# Patient Record
Sex: MALE | Race: BLACK OR AFRICAN AMERICAN | NOT HISPANIC OR LATINO | URBAN - METROPOLITAN AREA
[De-identification: names, ages, dates, MRNs, and addresses within clinical notes are randomized per-mention and may not be internally consistent; named-entity substitution may affect disease eponyms.]

---

## 2017-06-29 NOTE — PATIENT DISCUSSION
(Q97.265) Keratoconjunct sicca, not specified as Sjogren's, bilateral - Assesment : Examination revealed Dry Eye Syndrome - Plan : Monitor for changes. Recommended Refresh Optive prn or 2-3 times per day. Coupon given.

## 2017-06-29 NOTE — PATIENT DISCUSSION
(H25.13) Age-related nuclear cataract, bilateral - Assesment : Examination revealed cataract. Mild symptoms. - Plan : Monitor for changes. Advised patient to call our office with decreased vision or increased symptoms. Updated GL RX and CL Rx given today. RTC in 1 year for CL Exam, sooner if problems or changes.

## 2017-08-28 ENCOUNTER — IMPORTED ENCOUNTER (OUTPATIENT)
Dept: URBAN - METROPOLITAN AREA CLINIC 9 | Facility: CLINIC | Age: 72
End: 2017-08-28

## 2017-11-16 ENCOUNTER — IMPORTED ENCOUNTER (OUTPATIENT)
Dept: URBAN - METROPOLITAN AREA CLINIC 9 | Facility: CLINIC | Age: 72
End: 2017-11-16

## 2018-02-07 ENCOUNTER — IMPORTED ENCOUNTER (OUTPATIENT)
Dept: URBAN - METROPOLITAN AREA CLINIC 9 | Facility: CLINIC | Age: 73
End: 2018-02-07

## 2018-11-09 ENCOUNTER — IMPORTED ENCOUNTER (OUTPATIENT)
Dept: URBAN - METROPOLITAN AREA CLINIC 9 | Facility: CLINIC | Age: 73
End: 2018-11-09

## 2019-03-21 NOTE — PATIENT DISCUSSION
(H25.13) Age-related nuclear cataract, bilateral - Assesment : Examination revealed cataract. Mild symptoms. - Plan : Monitor for changes. Advised patient to call our office with decreased vision or increased symptoms. Updated CL Trials given today. Follow with Chrissy Rodriguez for further changes. RTC in 1 year for CL Exam, sooner if problems or changes.

## 2019-03-21 NOTE — PATIENT DISCUSSION
(V17.715) Keratoconjunct sicca, not specified as Sjogren's, bilateral - Assesment : Examination revealed Dry Eye Syndrome - Plan : Monitor for changes. Refresh Optive 2-3 times per day or prn.

## 2019-04-24 ENCOUNTER — IMPORTED ENCOUNTER (OUTPATIENT)
Dept: URBAN - METROPOLITAN AREA CLINIC 9 | Facility: CLINIC | Age: 74
End: 2019-04-24

## 2019-05-17 ENCOUNTER — IMPORTED ENCOUNTER (OUTPATIENT)
Dept: URBAN - METROPOLITAN AREA CLINIC 9 | Facility: CLINIC | Age: 74
End: 2019-05-17

## 2020-02-03 ENCOUNTER — IMPORTED ENCOUNTER (OUTPATIENT)
Dept: URBAN - METROPOLITAN AREA CLINIC 9 | Facility: CLINIC | Age: 75
End: 2020-02-03

## 2020-04-01 ENCOUNTER — IMPORTED ENCOUNTER (OUTPATIENT)
Dept: URBAN - METROPOLITAN AREA CLINIC 9 | Facility: CLINIC | Age: 75
End: 2020-04-01

## 2021-02-25 ENCOUNTER — IMPORTED ENCOUNTER (OUTPATIENT)
Dept: URBAN - METROPOLITAN AREA CLINIC 9 | Facility: CLINIC | Age: 76
End: 2021-02-25

## 2021-10-16 ASSESSMENT — VISUAL ACUITY
OD_SC: 20/20 SN
OD_CC: 20/20 SN
OS_CC: 20/20 -2 SN
OD_CC: 20/20 - SN
OS_CC: 20/20 - SN
OD_CC: 20/20 - SN
OD_CC: 20/20 SN
OS_SC: 20/20 SN
OS_CC: 20/20 SN

## 2021-10-16 ASSESSMENT — TONOMETRY
OS_IOP_MMHG: 22
OS_IOP_MMHG: 17
OD_IOP_MMHG: 20
OD_IOP_MMHG: 19
OD_IOP_MMHG: 21
OS_IOP_MMHG: 22
OD_IOP_MMHG: 20
OD_IOP_MMHG: 18
OD_IOP_MMHG: 18
OS_IOP_MMHG: 21
OD_IOP_MMHG: 21
OS_IOP_MMHG: 18
OS_IOP_MMHG: 19
OD_IOP_MMHG: 18
OS_IOP_MMHG: 18
OS_IOP_MMHG: 20

## 2021-10-16 ASSESSMENT — KERATOMETRY
OS_K2POWER_DIOPTERS: 41
OD_AXISANGLE_DEGREES: 79
OS_K1POWER_DIOPTERS: 40.75
OS_AXISANGLE_DEGREES: 38
OD_K2POWER_DIOPTERS: 41
OD_AXISANGLE2_DEGREES: 6
OS_K1POWER_DIOPTERS: 40.5
OD_K2POWER_DIOPTERS: 40.75
OD_AXISANGLE_DEGREES: 96
OD_K1POWER_DIOPTERS: 40.25
OS_AXISANGLE2_DEGREES: 128
OS_AXISANGLE_DEGREES: 52
OS_AXISANGLE2_DEGREES: 142
OD_AXISANGLE2_DEGREES: 169
OD_K1POWER_DIOPTERS: 39.75
OS_K2POWER_DIOPTERS: 41.25

## 2022-02-15 ENCOUNTER — ESTABLISHED PATIENT (OUTPATIENT)
Dept: URBAN - NONMETROPOLITAN AREA CLINIC 6 | Facility: CLINIC | Age: 77
End: 2022-02-15

## 2022-02-15 DIAGNOSIS — H02.122: ICD-10-CM

## 2022-02-15 DIAGNOSIS — H25.12: ICD-10-CM

## 2022-02-15 DIAGNOSIS — H25.11: ICD-10-CM

## 2022-02-15 DIAGNOSIS — H04.122: ICD-10-CM

## 2022-02-15 DIAGNOSIS — H02.051: ICD-10-CM

## 2022-02-15 DIAGNOSIS — H04.121: ICD-10-CM

## 2022-02-15 DIAGNOSIS — H40.013: ICD-10-CM

## 2022-02-15 DIAGNOSIS — H01.02A: ICD-10-CM

## 2022-02-15 DIAGNOSIS — H01.02B: ICD-10-CM

## 2022-02-15 PROCEDURE — 92014 COMPRE OPH EXAM EST PT 1/>: CPT

## 2022-02-15 PROCEDURE — 92133 CPTRZD OPH DX IMG PST SGM ON: CPT

## 2022-02-15 RX ORDER — NEOMYCIN SULFATE, POLYMYXIN B SULFATE AND DEXAMETHASONE 3.5; 10000; 1 MG/ML; [USP'U]/ML; MG/ML: 1 SUSPENSION OPHTHALMIC

## 2022-02-15 ASSESSMENT — VISUAL ACUITY
OS_GLARE: 20/40
OD_GLARE: 20/40
OU_CC: 20/20
OS_CC: 20/20-1
OD_CC: 20/20-1

## 2022-02-15 ASSESSMENT — TONOMETRY
OS_IOP_MMHG: 21
OD_IOP_MMHG: 20

## 2022-02-15 ASSESSMENT — KERATOMETRY
OS_AXISANGLE_DEGREES: 151
OS_AXISANGLE2_DEGREES: 61
OS_K1POWER_DIOPTERS: 40.50
OD_AXISANGLE2_DEGREES: 29
OS_K2POWER_DIOPTERS: 41.00
OD_AXISANGLE_DEGREES: 119
OD_K2POWER_DIOPTERS: 41.25
OD_K1POWER_DIOPTERS: 40.25

## 2022-08-18 ENCOUNTER — FOLLOW UP (OUTPATIENT)
Dept: URBAN - NONMETROPOLITAN AREA CLINIC 6 | Facility: CLINIC | Age: 77
End: 2022-08-18

## 2022-08-18 DIAGNOSIS — H01.02B: ICD-10-CM

## 2022-08-18 DIAGNOSIS — H04.123: ICD-10-CM

## 2022-08-18 DIAGNOSIS — H40.013: ICD-10-CM

## 2022-08-18 DIAGNOSIS — H01.02A: ICD-10-CM

## 2022-08-18 DIAGNOSIS — H52.03: ICD-10-CM

## 2022-08-18 PROCEDURE — 99213 OFFICE O/P EST LOW 20 MIN: CPT

## 2022-08-18 PROCEDURE — 92015 DETERMINE REFRACTIVE STATE: CPT

## 2022-08-18 RX ORDER — CARBOXYMETHYLCELLULOSE SODIUM AND GLYCERIN 5; 9 MG/ML; MG/ML: 1 SOLUTION/ DROPS OPHTHALMIC AS NEEDED

## 2022-08-18 ASSESSMENT — VISUAL ACUITY
OS_CC: 20/20-2
OD_CC: 20/20
OU_CC: 20/20

## 2022-08-18 ASSESSMENT — TONOMETRY
OS_IOP_MMHG: 19
OD_IOP_MMHG: 19

## 2023-05-19 ENCOUNTER — EMERGENCY VISIT (OUTPATIENT)
Dept: URBAN - METROPOLITAN AREA CLINIC 6 | Facility: CLINIC | Age: 78
End: 2023-05-19

## 2023-05-19 DIAGNOSIS — H02.122: ICD-10-CM

## 2023-05-19 DIAGNOSIS — H00.12: ICD-10-CM

## 2023-05-19 DIAGNOSIS — H02.125: ICD-10-CM

## 2023-05-19 DIAGNOSIS — H00.15: ICD-10-CM

## 2023-05-19 DIAGNOSIS — H40.013: ICD-10-CM

## 2023-05-19 PROCEDURE — 92012 INTRM OPH EXAM EST PATIENT: CPT

## 2023-05-19 PROCEDURE — 67800 REMOVE EYELID LESION: CPT

## 2023-05-19 RX ORDER — NEOMYCIN SULFATE, POLYMYXIN B SULFATE AND DEXAMETHASONE 3.5; 10000; 1 MG/G; [USP'U]/G; MG/G: OINTMENT OPHTHALMIC

## 2023-05-19 ASSESSMENT — KERATOMETRY
OD_AXISANGLE_DEGREES: 87
OS_AXISANGLE_DEGREES: 59
OS_K2POWER_DIOPTERS: 41.00
OD_AXISANGLE2_DEGREES: 177
OD_K2POWER_DIOPTERS: 41.00
OS_AXISANGLE2_DEGREES: 149
OS_K1POWER_DIOPTERS: 40.50
OD_K1POWER_DIOPTERS: 40.00

## 2023-05-19 ASSESSMENT — VISUAL ACUITY
OD_CC: 20/25-1
OS_CC: 20/25-2

## 2023-05-19 ASSESSMENT — TONOMETRY
OD_IOP_MMHG: 12
OS_IOP_MMHG: 14

## 2023-06-08 ENCOUNTER — CONSULTATION/EVALUATION (OUTPATIENT)
Dept: URBAN - NONMETROPOLITAN AREA CLINIC 6 | Facility: CLINIC | Age: 78
End: 2023-06-08

## 2023-06-08 DIAGNOSIS — H02.125: ICD-10-CM

## 2023-06-08 DIAGNOSIS — H02.122: ICD-10-CM

## 2023-06-08 PROCEDURE — 92012 INTRM OPH EXAM EST PATIENT: CPT

## 2023-06-08 PROCEDURE — 92285 EXTERNAL OCULAR PHOTOGRAPHY: CPT

## 2023-06-08 ASSESSMENT — KERATOMETRY
OD_K2POWER_DIOPTERS: 41.00
OD_K1POWER_DIOPTERS: 40.00
OS_AXISANGLE2_DEGREES: 149
OS_K2POWER_DIOPTERS: 41.00
OD_AXISANGLE2_DEGREES: 177
OS_K1POWER_DIOPTERS: 40.50
OS_AXISANGLE_DEGREES: 59
OD_AXISANGLE_DEGREES: 87

## 2023-06-08 ASSESSMENT — VISUAL ACUITY
OD_CC: 20/25
OS_CC: 20/25

## 2023-06-19 ENCOUNTER — POST-OP (OUTPATIENT)
Dept: URBAN - NONMETROPOLITAN AREA CLINIC 6 | Facility: CLINIC | Age: 78
End: 2023-06-19

## 2023-06-19 DIAGNOSIS — Z98.890: ICD-10-CM

## 2023-06-19 PROCEDURE — 99024 POSTOP FOLLOW-UP VISIT: CPT

## 2023-06-19 ASSESSMENT — VISUAL ACUITY
OD_CC: 20/25
OS_CC: 20/25

## 2023-07-20 ENCOUNTER — POST-OP (OUTPATIENT)
Dept: URBAN - NONMETROPOLITAN AREA CLINIC 6 | Facility: CLINIC | Age: 78
End: 2023-07-20

## 2023-07-20 DIAGNOSIS — Z98.890: ICD-10-CM

## 2023-07-20 PROCEDURE — 99024 POSTOP FOLLOW-UP VISIT: CPT

## 2023-08-30 ENCOUNTER — FOLLOW UP (OUTPATIENT)
Dept: URBAN - NONMETROPOLITAN AREA CLINIC 6 | Facility: CLINIC | Age: 78
End: 2023-08-30

## 2023-08-30 DIAGNOSIS — H40.013: ICD-10-CM

## 2023-08-30 DIAGNOSIS — H04.123: ICD-10-CM

## 2023-08-30 PROCEDURE — 99213 OFFICE O/P EST LOW 20 MIN: CPT

## 2023-08-30 ASSESSMENT — VISUAL ACUITY
OD_CC: 20/20
OS_CC: 20/20

## 2023-08-30 ASSESSMENT — TONOMETRY
OS_IOP_MMHG: 14
OD_IOP_MMHG: 16

## 2024-02-27 ENCOUNTER — ESTABLISHED PATIENT (OUTPATIENT)
Dept: URBAN - NONMETROPOLITAN AREA CLINIC 6 | Facility: CLINIC | Age: 79
End: 2024-02-27

## 2024-02-27 DIAGNOSIS — H04.123: ICD-10-CM

## 2024-02-27 DIAGNOSIS — H02.122: ICD-10-CM

## 2024-02-27 DIAGNOSIS — H25.13: ICD-10-CM

## 2024-02-27 DIAGNOSIS — H04.213: ICD-10-CM

## 2024-02-27 DIAGNOSIS — H40.013: ICD-10-CM

## 2024-02-27 DIAGNOSIS — H02.125: ICD-10-CM

## 2024-02-27 PROCEDURE — 68440 SNIP INC LACRIMAL PUNCTUM: CPT

## 2024-02-27 PROCEDURE — 92014 COMPRE OPH EXAM EST PT 1/>: CPT

## 2024-02-27 ASSESSMENT — VISUAL ACUITY
OD_GLARE: 20/20
OS_GLARE: 20/25
OU_SC: 20/60
OU_CC: 20/20
OS_SC: 20/60
OS_CC: 20/20
OD_CC: 20/20
OD_SC: 20/80

## 2024-02-27 ASSESSMENT — TONOMETRY
OS_IOP_MMHG: 14
OD_IOP_MMHG: 14

## 2024-08-27 ENCOUNTER — FOLLOW UP (OUTPATIENT)
Dept: URBAN - NONMETROPOLITAN AREA CLINIC 6 | Facility: CLINIC | Age: 79
End: 2024-08-27

## 2024-08-27 DIAGNOSIS — H40.013: ICD-10-CM

## 2024-08-27 DIAGNOSIS — H04.123: ICD-10-CM

## 2024-08-27 PROCEDURE — 99213 OFFICE O/P EST LOW 20 MIN: CPT

## 2024-08-27 PROCEDURE — 92133 CPTRZD OPH DX IMG PST SGM ON: CPT

## 2024-08-27 ASSESSMENT — VISUAL ACUITY
OU_CC: 20/25
OD_CC: 20/25
OS_CC: 20/25

## 2024-08-27 ASSESSMENT — TONOMETRY
OS_IOP_MMHG: 15
OD_IOP_MMHG: 15

## 2025-02-10 ENCOUNTER — COMPREHENSIVE EXAM (OUTPATIENT)
Age: 80
End: 2025-02-10

## 2025-02-10 DIAGNOSIS — H02.125: ICD-10-CM

## 2025-02-10 DIAGNOSIS — H25.13: ICD-10-CM

## 2025-02-10 DIAGNOSIS — H04.123: ICD-10-CM

## 2025-02-10 DIAGNOSIS — H02.122: ICD-10-CM

## 2025-02-10 DIAGNOSIS — H40.013: ICD-10-CM

## 2025-02-10 PROCEDURE — 92014 COMPRE OPH EXAM EST PT 1/>: CPT

## 2025-02-10 PROCEDURE — 92133 CPTRZD OPH DX IMG PST SGM ON: CPT

## 2025-02-10 PROCEDURE — 92020 GONIOSCOPY: CPT

## 2025-02-28 ENCOUNTER — DIAGNOSTICS ONLY (OUTPATIENT)
Age: 80
End: 2025-02-28

## 2025-02-28 DIAGNOSIS — H40.013: ICD-10-CM

## 2025-02-28 PROCEDURE — 92250 FUNDUS PHOTOGRAPHY W/I&R: CPT

## 2025-02-28 PROCEDURE — 92083 EXTENDED VISUAL FIELD XM: CPT

## 2025-06-16 ENCOUNTER — FOLLOW UP (OUTPATIENT)
Age: 80
End: 2025-06-16

## 2025-06-16 DIAGNOSIS — H40.013: ICD-10-CM

## 2025-06-16 PROCEDURE — 99213 OFFICE O/P EST LOW 20 MIN: CPT

## 2025-07-24 ENCOUNTER — CONSULTATION/EVALUATION (OUTPATIENT)
Age: 80
End: 2025-07-24

## 2025-07-24 DIAGNOSIS — H40.1131: ICD-10-CM

## 2025-07-24 PROBLEM — Z98.890: Noted: 2025-07-24

## 2025-07-24 PROCEDURE — 65855 TRABECULOPLASTY LASER SURG: CPT

## 2025-07-24 RX ORDER — PREDNISOLONE ACETATE 10 MG/ML: 1 SUSPENSION/ DROPS OPHTHALMIC

## 2025-08-28 ENCOUNTER — CLINIC PROCEDURE ONLY (OUTPATIENT)
Age: 80
End: 2025-08-28

## 2025-08-28 DIAGNOSIS — H40.1131: ICD-10-CM

## 2025-08-28 PROCEDURE — 65855 TRABECULOPLASTY LASER SURG: CPT
